# Patient Record
Sex: MALE | Race: WHITE
[De-identification: names, ages, dates, MRNs, and addresses within clinical notes are randomized per-mention and may not be internally consistent; named-entity substitution may affect disease eponyms.]

---

## 2019-04-17 ENCOUNTER — HOSPITAL ENCOUNTER (EMERGENCY)
Dept: HOSPITAL 76 - ED | Age: 69
Discharge: HOME | End: 2019-04-17
Payer: MEDICARE

## 2019-04-17 VITALS — SYSTOLIC BLOOD PRESSURE: 128 MMHG | DIASTOLIC BLOOD PRESSURE: 81 MMHG

## 2019-04-17 DIAGNOSIS — S61.412A: Primary | ICD-10-CM
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
## 2019-04-17 PROCEDURE — 12002 RPR S/N/AX/GEN/TRNK2.6-7.5CM: CPT

## 2019-04-17 PROCEDURE — 99283 EMERGENCY DEPT VISIT LOW MDM: CPT

## 2019-04-17 PROCEDURE — 99282 EMERGENCY DEPT VISIT SF MDM: CPT

## 2019-04-17 NOTE — ED PHYSICIAN DOCUMENTATION
PD HPI UPPER EXT INJURY





- Stated complaint


Stated Complaint: LEFT HAND CUT





- Chief complaint


Chief Complaint: Laceration





- History obtained from


History obtained from: Patient





- History of Present Illness


Location: Left


Type of injury: Blunt / blow (Hit his left hand with post  while 

volunteering just prior to arrival.  Tetanus is up-to-date.)





Review of Systems


Eyes: reports: Reviewed and negative


Nose: reports: Reviewed and negative





PD PAST MEDICAL HISTORY





- Allergies


Allergies/Adverse Reactions: 


                                    Allergies











Allergy/AdvReac Type Severity Reaction Status Date / Time


 


No Known Drug Allergies Allergy   Verified 04/17/19 15:12














PD ED PE NORMAL





- Vitals


Vital signs reviewed: Yes





- General


General: Alert and oriented X 3, No acute distress





- Extremities


Extremities: Other (The palm of the left hand proximal the second and third 

digits there is a 3 cm V-shaped laceration in the subcutaneous fat with intact 

flexor tendon strength of those digits and normal sensation at the tips.)





- Neuro


Neuro: Alert and oriented X 3, Normal speech





Results





- Vitals


Vitals: 





                               Vital Signs - 24 hr











  04/17/19





  15:09


 


Temperature 36.5 C


 


Heart Rate 69


 


Respiratory 14





Rate 


 


Blood Pressure 128/81 H


 


O2 Saturation 95








                                     Oxygen











O2 Source                      Room air

















Procedures





- Laceration (location)


  ** L hand


Length in cm: 3


Wound type: Stellate, Into subcut fat


Neurovascular status: Sensory intact, Motor intact


Tendon involvement: Tendon intact


Anesthesia: Lidocaine 1% with epi


Wound Preparation: Hibiclens, Irrigated copiously NS


Skin layer closure: Nylon, Interrupted, Size #-0 - enter number (4-0)


Other: Tetanus UTD


Complexity: Simple





Departure





- Departure


Disposition: 01 Home, Self Care


Clinical Impression: 


 Laceration





Condition: Good


Record reviewed to determine appropriate education?: Yes


Instructions:  ED Laceration Hand


Comments: 


Come back for any signs of infection which would include: Redness, swelling, 

drainage, increased pain, or fevers.


Follow-up with your physician in 14 days for suture removal.